# Patient Record
Sex: FEMALE | Race: WHITE | NOT HISPANIC OR LATINO | Employment: UNEMPLOYED | URBAN - METROPOLITAN AREA
[De-identification: names, ages, dates, MRNs, and addresses within clinical notes are randomized per-mention and may not be internally consistent; named-entity substitution may affect disease eponyms.]

---
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
## 2019-03-08 ENCOUNTER — TELEPHONE (OUTPATIENT)
Dept: FAMILY MEDICINE | Facility: CLINIC | Age: 19
End: 2019-03-08

## 2019-03-08 NOTE — TELEPHONE ENCOUNTER
Advised patient to call by 8AM tomorrow morning for Saturday AM apt at [place], she verbalized understanding

## 2019-04-11 ENCOUNTER — HOSPITAL ENCOUNTER (OUTPATIENT)
Dept: RADIOLOGY | Facility: HOSPITAL | Age: 19
Discharge: HOME | End: 2019-04-11
Attending: INTERNAL MEDICINE
Payer: COMMERCIAL

## 2019-04-11 DIAGNOSIS — Z02.0 SCHOOL PHYSICAL EXAM: ICD-10-CM

## 2019-04-11 DIAGNOSIS — Z02.0 SCHOOL PHYSICAL EXAM: Primary | ICD-10-CM

## 2019-04-11 PROCEDURE — 71046 X-RAY EXAM CHEST 2 VIEWS: CPT | Mod: TC,FY

## 2019-04-11 PROCEDURE — 71046 XR CHEST PA AND LATERAL: ICD-10-PCS | Mod: 26,,, | Performed by: RADIOLOGY

## 2019-04-11 PROCEDURE — 71046 X-RAY EXAM CHEST 2 VIEWS: CPT | Mod: 26,,, | Performed by: RADIOLOGY

## 2021-01-02 ENCOUNTER — IMMUNIZATION (OUTPATIENT)
Dept: INTERNAL MEDICINE | Facility: CLINIC | Age: 21
End: 2021-01-02
Payer: COMMERCIAL

## 2021-01-02 DIAGNOSIS — Z23 NEED FOR VACCINATION: ICD-10-CM

## 2021-01-02 PROCEDURE — 91300 COVID-19, MRNA, LNP-S, PF, 30 MCG/0.3 ML DOSE VACCINE: CPT | Mod: PBBFAC | Performed by: INTERNAL MEDICINE

## 2021-01-23 ENCOUNTER — IMMUNIZATION (OUTPATIENT)
Dept: INTERNAL MEDICINE | Facility: CLINIC | Age: 21
End: 2021-01-23
Payer: COMMERCIAL

## 2021-01-23 DIAGNOSIS — Z23 NEED FOR VACCINATION: Primary | ICD-10-CM

## 2021-01-23 PROCEDURE — 0002A COVID-19, MRNA, LNP-S, PF, 30 MCG/0.3 ML DOSE VACCINE: CPT | Mod: PBBFAC | Performed by: INTERNAL MEDICINE

## 2021-01-23 PROCEDURE — 91300 COVID-19, MRNA, LNP-S, PF, 30 MCG/0.3 ML DOSE VACCINE: CPT | Mod: PBBFAC | Performed by: INTERNAL MEDICINE

## 2022-04-04 ENCOUNTER — TELEPHONE (OUTPATIENT)
Dept: INTERNAL MEDICINE | Facility: CLINIC | Age: 22
End: 2022-04-04
Payer: COMMERCIAL

## 2022-04-04 NOTE — TELEPHONE ENCOUNTER
----- Message from Deborah Venegas sent at 4/4/2022  8:46 AM CDT -----  Type:  Needs Medical Advice    Who Called: pt  Symptoms (please be specific): pt would like to schedule appt to est care     Would the patient rather a call back or a response via [facility]? call  Best Call Back Number: [phone]  Additional Information:

## 2022-08-25 LAB
HPV RNA, HR E6/E7, TMA: NOT DETECTED
PAP RECOMMENDATION EXT: NORMAL

## 2023-03-08 ENCOUNTER — OFFICE VISIT (OUTPATIENT)
Dept: OBSTETRICS AND GYNECOLOGY | Facility: CLINIC | Age: 23
End: 2023-03-08
Payer: COMMERCIAL

## 2023-03-08 DIAGNOSIS — N76.1 CHRONIC VAGINITIS: ICD-10-CM

## 2023-03-08 DIAGNOSIS — N76.5 VAGINAL ULCERATION: ICD-10-CM

## 2023-03-08 DIAGNOSIS — N89.8 VAGINAL IRRITATION: Primary | ICD-10-CM

## 2023-03-08 DIAGNOSIS — N94.2 VAGINISMUS: ICD-10-CM

## 2023-03-08 PROCEDURE — 81514 NFCT DS BV&VAGINITIS DNA ALG: CPT | Performed by: OBSTETRICS & GYNECOLOGY

## 2023-03-08 PROCEDURE — 99204 PR OFFICE/OUTPT VISIT, NEW, LEVL IV, 45-59 MIN: ICD-10-PCS | Mod: S$GLB,,, | Performed by: OBSTETRICS & GYNECOLOGY

## 2023-03-08 PROCEDURE — 99204 OFFICE O/P NEW MOD 45 MIN: CPT | Mod: S$GLB,,, | Performed by: OBSTETRICS & GYNECOLOGY

## 2023-03-08 PROCEDURE — 99999 PR PBB SHADOW E&M-EST. PATIENT-LVL III: ICD-10-PCS | Mod: PBBFAC,,, | Performed by: OBSTETRICS & GYNECOLOGY

## 2023-03-08 PROCEDURE — 99999 PR PBB SHADOW E&M-EST. PATIENT-LVL III: CPT | Mod: PBBFAC,,, | Performed by: OBSTETRICS & GYNECOLOGY

## 2023-03-08 RX ORDER — NORGESTIMATE AND ETHINYL ESTRADIOL 0.25-0.035
1 KIT ORAL DAILY
Qty: 84 TABLET | Refills: 3 | Status: SHIPPED | OUTPATIENT
Start: 2023-03-08 | End: 2023-06-02

## 2023-03-08 NOTE — PROGRESS NOTES
Chief Complaint   Patient presents with    vaginal irritation       HPI:   Talita Lazaro 23 y.o.  is here for several issues. She didn't have issues before becoming sexually active. She started birth control 1-2  months before she was  last year and became sexually active then had difficulty with vaginal penetration for about 2 months before she was able to have intercourse. She feels like the difficulty likely had an emotional/psychologic component and over time she was able to relax enough to have intercourse but she reports when she does have intercourse now it is painful. Afterwards she will feel swollen and irritated for several days. There is an area at the introitus that was irritated then a few months ago during intercourse seemed to make a deep cut that has never healed completely and that will hurt during intercourse and burn when urine touches it. She also will use lubricants (has tried several brands) and coconut oil during intercourse still after a few minutes will have vaginal dryness during intercourse. She has had 6 separate yeast infections in the past 3 months. Only one was swabbed positive and that was around September of last year but other swabs were negative.  She was seeing on ob-gyn who did a heavy dose diflucan and then diflucan weekly for 2 months, she feels like the irritation was less then. She has switched soap/detergents and changed to cotton underwear. Her partner is circumised and he doesn't have issues besides noticing that it does feel dry during intercourse. They tried changing her birth control pills from sprintec to a different brand but she didn't notice a difference. This is her only partner and declines STD testing.    She does feel that she has some dry eyes/mouth. She does notice vaginal discharge but reports she has always had a vaginal discharge even before this started.      Patient's last menstrual period was 02/24/2023.     History reviewed. No pertinent past  medical history.    Past Surgical History:   Procedure Laterality Date    RHINOPLASTY      WISDOM TOOTH EXTRACTION         History reviewed. No pertinent family history.    Social History     Socioeconomic History    Marital status: Single   Tobacco Use    Smoking status: Never    Smokeless tobacco: Never   Substance and Sexual Activity    Alcohol use: Never    Drug use: Never    Sexual activity: Yes     Partners: Male     Birth control/protection: OCP   Social History Narrative    Lives at home with both parents and siblings        No smokers        No pets        Attends [employer]    Grade 9       OB History          0    Para   0    Term   0       0    AB   0    Living   0         SAB   0    IAB   0    Ectopic   0    Multiple   0    Live Births   0                  COMPREHENSIVE GYN HISTORY:  PAP History: Denies abnormal Paps.  Infection History: Denies STDs. Denies PID.  Benign History: Denies uterine fibroids. Denies ovarian cysts. Denies endometriosis.   Cancer History: Denies cervical cancer. Denies uterine cancer or hyperplasia. Denies ovarian cancer. Denies vulvar cancer or pre-cancer. Denies vaginal cancer or pre-cancer. Denies breast cancer. Denies colon cancer.  Sexual Activity History:   reports being sexually active and has had partner(s) who are male. She reports using the following method of birth control/protection: OCP.         ROS:    All other ROS negative     PE:   LMP 2023     APPEARANCE: Well nourished, well developed, in no acute distress.  NEUROLOGIC: orientated to person, place and time, normal mood and affect     PELVIC:   EXTERNAL GENITALIA/VULVA: in a shallow 5mm ulcer/thin skin at the introitus with thicker skin around it. No significant erythema, drainage at this area, is tender to palpation.  Normal female genitalia. Pelvic floor tight no spasms noted.   URETHRAL MEATUS: Normal size and location, no lesions, no prolapse.  URETHRA: No masses, tenderness,  prolapse or scarring.  BLADDER: non-tender, no masses  VAGINA: well rugated however erythematous in appearance, thick clumpy and thin watery yellow discharge, no significant cystocele or rectocele.  CERVIX: No lesions and discharge.  UTERUS: normal size, regular shape, mobile, non-tender, bladder base nontender.  ADNEXA: No masses or tenderness.  PERINEUM: normal in appearance, no external hemorrhoids         1. Vaginal irritation    2. Vaginal ulceration    3. Chronic vaginitis    4. Vaginismus        Plan:  Most likely a component of tight pelvic musculature/spasm that is contributing however pelvic muscles not noted to spasm on exam. Tolerated a small speculum well. Discharge appears yeast like, affrim done, declines gc/ct. Will treat for yeast since seemed to improve with treatment for that last time and await swab results. Can't pinpoint an irritant. Non-healing ulcerated area could be related to friction/intercourse but suspect underlying condition causing the irritation/discharge is contributing to it not healing. Discussed with her that some of the things that can cause her symptoms are going to be a process of elimination. Could be slightly hypoestrogenic due to birth control as well though this would be a more of an extreme presentation than I would expect being that she isn't on a progesterone only birth control.         Face to Face time with patient: 45 minutes of total time spent on the encounter, which includes face to face time and non-face to face time preparing to see the patient (eg, review of tests), Obtaining and/or reviewing separately obtained history, Documenting clinical information in the electronic or other health record, Independently interpreting results (not separately reported) and communicating results to the patient/family/caregiver, or Care coordination (not separately reported).

## 2023-03-10 LAB
BACTERIAL VAGINOSIS DNA: NEGATIVE
CANDIDA GLABRATA DNA: NEGATIVE
CANDIDA KRUSEI DNA: NEGATIVE
CANDIDA RRNA VAG QL PROBE: NEGATIVE
T VAGINALIS RRNA GENITAL QL PROBE: NEGATIVE

## 2023-03-11 ENCOUNTER — PATIENT MESSAGE (OUTPATIENT)
Dept: OBSTETRICS AND GYNECOLOGY | Facility: HOSPITAL | Age: 23
End: 2023-03-11
Payer: COMMERCIAL

## 2023-03-11 ENCOUNTER — PATIENT MESSAGE (OUTPATIENT)
Dept: OBSTETRICS AND GYNECOLOGY | Facility: CLINIC | Age: 23
End: 2023-03-11
Payer: COMMERCIAL

## 2023-03-13 ENCOUNTER — TELEPHONE (OUTPATIENT)
Dept: OBSTETRICS AND GYNECOLOGY | Facility: CLINIC | Age: 23
End: 2023-03-13
Payer: COMMERCIAL

## 2023-03-13 RX ORDER — FLUCONAZOLE 200 MG/1
200 TABLET ORAL
Qty: 3 TABLET | Refills: 0 | Status: SHIPPED | OUTPATIENT
Start: 2023-03-13 | End: 2023-03-20

## 2023-03-13 NOTE — TELEPHONE ENCOUNTER
Appt made for April 4th, pt wants to make sure that you are ok with that date.     Pt also went to the pharmacy. They hadn't received her script yet

## 2023-03-13 NOTE — TELEPHONE ENCOUNTER
----- Message from Marsha Jeffery sent at 3/13/2023 10:22 AM CDT -----  .Type:  Needs Medical Advice    Who Called: pt   Would the patient rather a call back or a response via [facility]? call  Best Call Back Number: [phone]  Additional Information:       Pt wanted to speak to Amita about antibiotics that were supposed to be sent to the pharmacy after receiving her test results.      Pt would also like to move current appt up to an earlier date if possible. Says that she would like to know if Dr. Walton wants to see her 4/4 or sooner than that

## 2023-03-15 ENCOUNTER — PATIENT OUTREACH (OUTPATIENT)
Dept: ADMINISTRATIVE | Facility: HOSPITAL | Age: 23
End: 2023-03-15
Payer: COMMERCIAL

## 2023-03-21 ENCOUNTER — OFFICE VISIT (OUTPATIENT)
Dept: OBSTETRICS AND GYNECOLOGY | Facility: CLINIC | Age: 23
End: 2023-03-21
Payer: COMMERCIAL

## 2023-03-21 VITALS
DIASTOLIC BLOOD PRESSURE: 62 MMHG | BODY MASS INDEX: 21.53 KG/M2 | HEIGHT: 63 IN | WEIGHT: 121.5 LBS | SYSTOLIC BLOOD PRESSURE: 99 MMHG

## 2023-03-21 DIAGNOSIS — N76.1 CHRONIC VAGINITIS: ICD-10-CM

## 2023-03-21 DIAGNOSIS — N89.8 VAGINAL IRRITATION: Primary | ICD-10-CM

## 2023-03-21 DIAGNOSIS — N76.5 VAGINAL ULCERATION: ICD-10-CM

## 2023-03-21 PROCEDURE — 99215 OFFICE O/P EST HI 40 MIN: CPT | Mod: S$GLB,,, | Performed by: OBSTETRICS & GYNECOLOGY

## 2023-03-21 PROCEDURE — 99999 PR PBB SHADOW E&M-EST. PATIENT-LVL III: ICD-10-PCS | Mod: PBBFAC,,, | Performed by: OBSTETRICS & GYNECOLOGY

## 2023-03-21 PROCEDURE — 3008F BODY MASS INDEX DOCD: CPT | Mod: CPTII,S$GLB,, | Performed by: OBSTETRICS & GYNECOLOGY

## 2023-03-21 PROCEDURE — 3074F SYST BP LT 130 MM HG: CPT | Mod: CPTII,S$GLB,, | Performed by: OBSTETRICS & GYNECOLOGY

## 2023-03-21 PROCEDURE — 1159F PR MEDICATION LIST DOCUMENTED IN MEDICAL RECORD: ICD-10-PCS | Mod: CPTII,S$GLB,, | Performed by: OBSTETRICS & GYNECOLOGY

## 2023-03-21 PROCEDURE — 99215 PR OFFICE/OUTPT VISIT, EST, LEVL V, 40-54 MIN: ICD-10-PCS | Mod: S$GLB,,, | Performed by: OBSTETRICS & GYNECOLOGY

## 2023-03-21 PROCEDURE — 3074F PR MOST RECENT SYSTOLIC BLOOD PRESSURE < 130 MM HG: ICD-10-PCS | Mod: CPTII,S$GLB,, | Performed by: OBSTETRICS & GYNECOLOGY

## 2023-03-21 PROCEDURE — 3008F PR BODY MASS INDEX (BMI) DOCUMENTED: ICD-10-PCS | Mod: CPTII,S$GLB,, | Performed by: OBSTETRICS & GYNECOLOGY

## 2023-03-21 PROCEDURE — 99999 PR PBB SHADOW E&M-EST. PATIENT-LVL III: CPT | Mod: PBBFAC,,, | Performed by: OBSTETRICS & GYNECOLOGY

## 2023-03-21 PROCEDURE — 3078F DIAST BP <80 MM HG: CPT | Mod: CPTII,S$GLB,, | Performed by: OBSTETRICS & GYNECOLOGY

## 2023-03-21 PROCEDURE — 1159F MED LIST DOCD IN RCRD: CPT | Mod: CPTII,S$GLB,, | Performed by: OBSTETRICS & GYNECOLOGY

## 2023-03-21 PROCEDURE — 3078F PR MOST RECENT DIASTOLIC BLOOD PRESSURE < 80 MM HG: ICD-10-PCS | Mod: CPTII,S$GLB,, | Performed by: OBSTETRICS & GYNECOLOGY

## 2023-03-21 RX ORDER — DROSPIRENONE AND ESTETROL 3-14.2(28)
1 KIT ORAL
COMMUNITY
Start: 2023-01-11 | End: 2023-06-02

## 2023-03-22 RX ORDER — CLINDAMYCIN PHOSPHATE 20 MG/G
CREAM VAGINAL NIGHTLY
Qty: 40 G | Refills: 1 | Status: SHIPPED | OUTPATIENT
Start: 2023-03-22 | End: 2023-04-05

## 2023-03-22 NOTE — PROGRESS NOTES
Chief Complaint   Patient presents with    Vaginal Pain       HPI:   Talita Lazaro 23 y.o.  is here for f/u after diflucan. She didn't have issues before becoming sexually active. She started birth control 1-2  months before she was  last year and became sexually active then had difficulty with vaginal penetration for about 2 months before she was able to have intercourse. She feels like the difficulty likely had an emotional/psychologic component and over time she was able to relax enough to have intercourse but she reports when she does have intercourse now it is painful. Afterwards she will feel swollen and irritated for several days. There is an area at the introitus that was irritated then a few months ago during intercourse seemed to make a deep cut that has never healed completely and that will hurt during intercourse and burn when urine touches it. She also will use lubricants (has tried several brands) and coconut oil during intercourse still after a few minutes will have vaginal dryness during intercourse. She has had 6 separate yeast infections in the past 3 months. Only one was swabbed positive and that was around September of last year but other swabs were negative.  She was seeing on ob-gyn who did a heavy dose diflucan and then diflucan weekly for 2 months, she feels like the irritation was less then. She has switched soap/detergents and changed to cotton underwear. Her partner is circumised and he doesn't have issues besides noticing that it does feel dry during intercourse. They tried changing her birth control pills from sprintec to a different brand but she didn't notice a difference. This is her only partner and declines STD testing.    She does feel that she has some dry eyes/mouth. She does notice vaginal discharge but reports she has always had a vaginal discharge even before this started.     Is accompanied by partner today, Since last visit reports that had tried vaginal moisturizer  in the past and felt like it caused inflammation around her urethra. She also reports that her partner was away for a month and the ulcerated area didn't heal then. She doesn't seem to have skin reactions to his semen if it touches other places. She also seems to be getting irritation and inflammation when on her cycle. They are not sexually active when she is on her cycle. It will cause inflammation on the labia majora and she will place A&D on it and the skin will peel.      Patient's last menstrual period was 2023.     No past medical history on file.    Past Surgical History:   Procedure Laterality Date    RHINOPLASTY      WISDOM TOOTH EXTRACTION         No family history on file.    Social History     Socioeconomic History    Marital status: Single   Tobacco Use    Smoking status: Never    Smokeless tobacco: Never   Substance and Sexual Activity    Alcohol use: Never    Drug use: Never    Sexual activity: Yes     Partners: Male     Birth control/protection: OCP   Social History Narrative    Lives at home with both parents and siblings        No smokers        No pets        Attends [employer]    Grade 9       OB History          0    Para   0    Term   0       0    AB   0    Living   0         SAB   0    IAB   0    Ectopic   0    Multiple   0    Live Births   0                  COMPREHENSIVE GYN HISTORY:  PAP History: Denies abnormal Paps.  Infection History: Denies STDs. Denies PID.  Benign History: Denies uterine fibroids. Denies ovarian cysts. Denies endometriosis.   Cancer History: Denies cervical cancer. Denies uterine cancer or hyperplasia. Denies ovarian cancer. Denies vulvar cancer or pre-cancer. Denies vaginal cancer or pre-cancer. Denies breast cancer. Denies colon cancer.  Sexual Activity History:   reports being sexually active and has had partner(s) who are male. She reports using the following method of birth control/protection: OCP.         ROS:    All other ROS negative  "    PE:   BP 99/62   Ht 5' 3" (1.6 m)   Wt 55.1 kg (121 lb 7.6 oz)   LMP 02/24/2023   BMI 21.52 kg/m²     APPEARANCE: Well nourished, well developed, in no acute distress.  NEUROLOGIC: orientated to person, place and time, normal mood and affect     PELVIC:   EXTERNAL GENITALIA/VULVA: in a shallow 5mm ulcer/thin skin at the introitus with thicker skin around it with slight white appearance. No significant erythema, drainage at this area, is tender to palpation.  Normal female genitalia. Pelvic floor tight no spasms noted.   URETHRAL MEATUS: Normal size and location, no lesions, no prolapse.  URETHRA: No masses, tenderness, prolapse or scarring.  BLADDER: non-tender, no masses  VAGINA: well rugated however erythematous in appearance, thick clumpy and thin watery yellow discharge, no change from last visit, no significant cystocele or rectocele.  CERVIX: No lesions and discharge.  UTERUS: normal size, regular shape, mobile, non-tender, bladder base nontender.  ADNEXA: No masses or tenderness.  PERINEUM: normal in appearance, no external hemorrhoids         1. Vaginal irritation    2. Vaginal ulceration    3. Chronic vaginitis          Plan:  Yeast swab was negative and no improvement with treatment. Discussed with her and her partner that I suspect what we thought was yeast in the past likely wasn't and there is some other underlying etiology causing the inflammation. Could be allergic, though can't pinpoint an irritant, could be DIV, could be atropic related. Doesn't make since that is having a flair when sexually active if it is intercourse related. Non-healing ulcerated area could be related to friction/intercourse but suspect underlying condition causing the irritation/discharge is contributing to it not healing. Discussed with her that some of the things that can cause her symptoms are going to be a process of elimination. Could be slightly hypoestrogenic due to birth control as well though this would be a " more of an extreme presentation than I would expect being that she isn't on a progesterone only birth control. We discussed choices for treatment and to try to eliminate things one at a time. Choices would be to stop the birth control and/or add back vaginal estrogen, if the ulcerated area doesn't heal within the next month then needs biopsy of it. Other choice would be to treat for DIV. If resolves with DIV treatment then birth control could be contributing and may need to do vaginal estrogen.        Face to Face time with patient: 90 minutes of total time spent on the encounter, which includes face to face time and non-face to face time preparing to see the patient (eg, review of tests), Obtaining and/or reviewing separately obtained history, Documenting clinical information in the electronic or other health record, Independently interpreting results (not separately reported) and communicating results to the patient/family/caregiver, or Care coordination (not separately reported).

## 2023-03-23 ENCOUNTER — PATIENT MESSAGE (OUTPATIENT)
Dept: OBSTETRICS AND GYNECOLOGY | Facility: CLINIC | Age: 23
End: 2023-03-23
Payer: COMMERCIAL

## 2023-05-09 ENCOUNTER — PATIENT MESSAGE (OUTPATIENT)
Dept: OBSTETRICS AND GYNECOLOGY | Facility: CLINIC | Age: 23
End: 2023-05-09
Payer: COMMERCIAL

## 2023-05-12 ENCOUNTER — PATIENT MESSAGE (OUTPATIENT)
Dept: OBSTETRICS AND GYNECOLOGY | Facility: CLINIC | Age: 23
End: 2023-05-12

## 2023-05-12 ENCOUNTER — OFFICE VISIT (OUTPATIENT)
Dept: OBSTETRICS AND GYNECOLOGY | Facility: CLINIC | Age: 23
End: 2023-05-12
Payer: COMMERCIAL

## 2023-05-12 VITALS — BODY MASS INDEX: 21.68 KG/M2 | WEIGHT: 122.38 LBS

## 2023-05-12 DIAGNOSIS — Z30.09 ENCOUNTER FOR OTHER GENERAL COUNSELING OR ADVICE ON CONTRACEPTION: ICD-10-CM

## 2023-05-12 DIAGNOSIS — N76.1 CHRONIC VAGINITIS: ICD-10-CM

## 2023-05-12 DIAGNOSIS — R30.0 DYSURIA: Primary | ICD-10-CM

## 2023-05-12 PROCEDURE — 1159F MED LIST DOCD IN RCRD: CPT | Mod: CPTII,S$GLB,, | Performed by: OBSTETRICS & GYNECOLOGY

## 2023-05-12 PROCEDURE — 99999 PR PBB SHADOW E&M-EST. PATIENT-LVL II: CPT | Mod: PBBFAC,,, | Performed by: OBSTETRICS & GYNECOLOGY

## 2023-05-12 PROCEDURE — 99215 OFFICE O/P EST HI 40 MIN: CPT | Mod: S$GLB,,, | Performed by: OBSTETRICS & GYNECOLOGY

## 2023-05-12 PROCEDURE — 3008F PR BODY MASS INDEX (BMI) DOCUMENTED: ICD-10-PCS | Mod: CPTII,S$GLB,, | Performed by: OBSTETRICS & GYNECOLOGY

## 2023-05-12 PROCEDURE — 1159F PR MEDICATION LIST DOCUMENTED IN MEDICAL RECORD: ICD-10-PCS | Mod: CPTII,S$GLB,, | Performed by: OBSTETRICS & GYNECOLOGY

## 2023-05-12 PROCEDURE — 99999 PR PBB SHADOW E&M-EST. PATIENT-LVL II: ICD-10-PCS | Mod: PBBFAC,,, | Performed by: OBSTETRICS & GYNECOLOGY

## 2023-05-12 PROCEDURE — 99215 PR OFFICE/OUTPT VISIT, EST, LEVL V, 40-54 MIN: ICD-10-PCS | Mod: S$GLB,,, | Performed by: OBSTETRICS & GYNECOLOGY

## 2023-05-12 PROCEDURE — 3008F BODY MASS INDEX DOCD: CPT | Mod: CPTII,S$GLB,, | Performed by: OBSTETRICS & GYNECOLOGY

## 2023-05-12 NOTE — PROGRESS NOTES
Chief Complaint   Patient presents with    Follow-up       HPI:   Talita Lazaro 23 y.o.  is here for f/u after clindamycin cream and stopping ocps. She has been off for a month. She didn't have issues before becoming sexually active. She is now able to make more lubrication with intercourse and it is no longer painful except where that cut is. She doesn't have the swelling afterwards. She hasn't had any more feelings of yeast. She has had a UTI, typically feelings of dysuria and frequency and went to urgent care and was told she had leuk est in her urine so they treated for a UTI and it resolved. She reports the heavy discharge seems to have resolved. She doesn't notice it on her underwear anymore. The cut area is still there. She doesn't want to get pregnant but doesn't know what she wants to do for birth control. Condoms are also somewhat uncomfortable       Patient's last menstrual period was 2023 (exact date).     History reviewed. No pertinent past medical history.    Past Surgical History:   Procedure Laterality Date    RHINOPLASTY      WISDOM TOOTH EXTRACTION         History reviewed. No pertinent family history.    Social History     Socioeconomic History    Marital status: Single   Tobacco Use    Smoking status: Never    Smokeless tobacco: Never   Substance and Sexual Activity    Alcohol use: Never    Drug use: Never    Sexual activity: Yes     Partners: Male     Birth control/protection: OCP   Social History Narrative    Lives at home with both parents and siblings        No smokers        No pets        Attends [employer]    Grade 9       OB History          0    Para   0    Term   0       0    AB   0    Living   0         SAB   0    IAB   0    Ectopic   0    Multiple   0    Live Births   0                  COMPREHENSIVE GYN HISTORY:  PAP History: Denies abnormal Paps.  Infection History: Denies STDs. Denies PID.  Benign History: Denies uterine fibroids. Denies ovarian cysts.  Denies endometriosis.   Cancer History: Denies cervical cancer. Denies uterine cancer or hyperplasia. Denies ovarian cancer. Denies vulvar cancer or pre-cancer. Denies vaginal cancer or pre-cancer. Denies breast cancer. Denies colon cancer.  Sexual Activity History:   reports being sexually active and has had partner(s) who are male. She reports using the following method of birth control/protection: OCP.         ROS:    All other ROS negative     PE:   Wt 55.5 kg (122 lb 6.4 oz)   LMP 04/21/2023 (Exact Date)   BMI 21.68 kg/m²     APPEARANCE: Well nourished, well developed, in no acute distress.  NEUROLOGIC: orientated to person, place and time, normal mood and affect     PELVIC:   EXTERNAL GENITALIA/VULVA: in a shallow 5mm ulcer/thin skin at the introitus with thicker skin around it with slight white appearance. No significant erythema, drainage at this area, is tender to palpation.  Normal female genitalia. Pelvic floor tight no spasms noted.   URETHRAL MEATUS: Normal size and location, no lesions, no prolapse.  URETHRA: No masses, tenderness, prolapse or scarring.  BLADDER: non-tender, no masses  VAGINA: normal in appearance, no discharge, no change from last visit, no significant cystocele or rectocele.  CERVIX: No lesions and discharge.  UTERUS: normal size, regular shape, mobile, non-tender, bladder base nontender.  ADNEXA: No masses or tenderness.  PERINEUM: normal in appearance, no external hemorrhoids         1. Dysuria    2. Chronic vaginitis    3. Encounter for other general counseling or advice on contraception          Plan:  Suspect most of the issue was DIV possibly caused exacerbated by hypo-estrogen on ocps. Area on perineum is improved  but to resolved. Discussed that I would recommend biopsy at this time. Reasonable to try to add an estrogen to the area for a few weeks but if not better then to biopsy, could be chronic irritant causing it as well but if was a tear then I would have expected it  to resolve. I would recommend to excise and repair the area as a last resort because I would be concerned about stenosis to the area.   Discussed birth control choices, feel like paragard IUD would be best however I am concerned about her h/o recurrent vaginitis. Reasonable to try a different pill vs patch.   Hard to saw about recurrent UTIs. Would recommend leaving a urine sample with us for culture next time she has one. If related to intercourse then can do a preventative after intercourse         Face to Face time with patient: 50 minutes of total time spent on the encounter, which includes face to face time and non-face to face time preparing to see the patient (eg, review of tests), Obtaining and/or reviewing separately obtained history, Documenting clinical information in the electronic or other health record, Independently interpreting results (not separately reported) and communicating results to the patient/family/caregiver, or Care coordination (not separately reported).

## 2023-05-20 ENCOUNTER — PATIENT MESSAGE (OUTPATIENT)
Dept: OBSTETRICS AND GYNECOLOGY | Facility: CLINIC | Age: 23
End: 2023-05-20
Payer: COMMERCIAL

## 2023-05-21 RX ORDER — ESTRADIOL 0.1 MG/G
CREAM VAGINAL
Qty: 42.5 G | Refills: 1 | Status: SHIPPED | OUTPATIENT
Start: 2023-05-21 | End: 2023-05-24 | Stop reason: SDUPTHER

## 2023-05-24 RX ORDER — ESTRADIOL 0.1 MG/G
CREAM VAGINAL
Qty: 42.5 G | Refills: 1 | Status: SHIPPED | OUTPATIENT
Start: 2023-05-24

## 2023-05-24 NOTE — TELEPHONE ENCOUNTER
----- Message from Bc Smith sent at 5/24/2023  1:36 PM CDT -----  Type:  RX Refill Request    Who Called: pt  Refill or New Rx:refill  RX Name and Strength:estradioL (ESTRACE) 0.01 % (0.1 mg/gram) vaginal cream  Preferred Pharmacy with phone number:[facility] pharmacy [id number]  Local or Mail Order:local  Ordering Provider:dayna  Would the patient rather a call back or a response via [facility]? Call   Best Call Back Number:[phone]  Additional Information: medication was sent to the wrong pharmacy

## 2023-05-31 ENCOUNTER — PATIENT MESSAGE (OUTPATIENT)
Dept: OBSTETRICS AND GYNECOLOGY | Facility: CLINIC | Age: 23
End: 2023-05-31
Payer: COMMERCIAL

## 2023-06-02 ENCOUNTER — TELEPHONE (OUTPATIENT)
Dept: FAMILY MEDICINE | Facility: CLINIC | Age: 23
End: 2023-06-02
Payer: COMMERCIAL

## 2023-06-02 ENCOUNTER — PATIENT MESSAGE (OUTPATIENT)
Dept: FAMILY MEDICINE | Facility: CLINIC | Age: 23
End: 2023-06-02

## 2023-06-02 ENCOUNTER — OFFICE VISIT (OUTPATIENT)
Dept: FAMILY MEDICINE | Facility: CLINIC | Age: 23
End: 2023-06-02
Payer: COMMERCIAL

## 2023-06-02 VITALS
OXYGEN SATURATION: 99 % | SYSTOLIC BLOOD PRESSURE: 110 MMHG | BODY MASS INDEX: 22.34 KG/M2 | DIASTOLIC BLOOD PRESSURE: 58 MMHG | WEIGHT: 126.13 LBS | HEART RATE: 73 BPM

## 2023-06-02 DIAGNOSIS — Z11.4 ENCOUNTER FOR SCREENING FOR HIV: ICD-10-CM

## 2023-06-02 DIAGNOSIS — M25.50 MULTIPLE JOINT PAIN: ICD-10-CM

## 2023-06-02 DIAGNOSIS — Z11.59 NEED FOR HEPATITIS C SCREENING TEST: ICD-10-CM

## 2023-06-02 DIAGNOSIS — Z00.01 ENCOUNTER FOR GENERAL ADULT MEDICAL EXAMINATION WITH ABNORMAL FINDINGS: Primary | ICD-10-CM

## 2023-06-02 DIAGNOSIS — E55.9 VITAMIN D DEFICIENCY: ICD-10-CM

## 2023-06-02 DIAGNOSIS — N89.8 VAGINAL DRYNESS: ICD-10-CM

## 2023-06-02 DIAGNOSIS — R68.2 DRY MOUTH: ICD-10-CM

## 2023-06-02 DIAGNOSIS — R53.83 FATIGUE, UNSPECIFIED TYPE: ICD-10-CM

## 2023-06-02 PROBLEM — H60.12 CELLULITIS OF LEFT EARLOBE: Status: RESOLVED | Noted: 2017-05-29 | Resolved: 2023-06-02

## 2023-06-02 PROCEDURE — 1159F MED LIST DOCD IN RCRD: CPT | Mod: CPTII,S$GLB,, | Performed by: FAMILY MEDICINE

## 2023-06-02 PROCEDURE — 3078F PR MOST RECENT DIASTOLIC BLOOD PRESSURE < 80 MM HG: ICD-10-PCS | Mod: CPTII,S$GLB,, | Performed by: FAMILY MEDICINE

## 2023-06-02 PROCEDURE — 1159F PR MEDICATION LIST DOCUMENTED IN MEDICAL RECORD: ICD-10-PCS | Mod: CPTII,S$GLB,, | Performed by: FAMILY MEDICINE

## 2023-06-02 PROCEDURE — 3008F BODY MASS INDEX DOCD: CPT | Mod: CPTII,S$GLB,, | Performed by: FAMILY MEDICINE

## 2023-06-02 PROCEDURE — 3044F HG A1C LEVEL LT 7.0%: CPT | Mod: CPTII,S$GLB,, | Performed by: FAMILY MEDICINE

## 2023-06-02 PROCEDURE — 99999 PR PBB SHADOW E&M-EST. PATIENT-LVL III: ICD-10-PCS | Mod: PBBFAC,,, | Performed by: FAMILY MEDICINE

## 2023-06-02 PROCEDURE — 99213 OFFICE O/P EST LOW 20 MIN: CPT | Mod: 25,S$GLB,, | Performed by: FAMILY MEDICINE

## 2023-06-02 PROCEDURE — 3074F SYST BP LT 130 MM HG: CPT | Mod: CPTII,S$GLB,, | Performed by: FAMILY MEDICINE

## 2023-06-02 PROCEDURE — 3074F PR MOST RECENT SYSTOLIC BLOOD PRESSURE < 130 MM HG: ICD-10-PCS | Mod: CPTII,S$GLB,, | Performed by: FAMILY MEDICINE

## 2023-06-02 PROCEDURE — 99395 PREV VISIT EST AGE 18-39: CPT | Mod: S$GLB,,, | Performed by: FAMILY MEDICINE

## 2023-06-02 PROCEDURE — 99395 PR PREVENTIVE VISIT,EST,18-39: ICD-10-PCS | Mod: S$GLB,,, | Performed by: FAMILY MEDICINE

## 2023-06-02 PROCEDURE — 3008F PR BODY MASS INDEX (BMI) DOCUMENTED: ICD-10-PCS | Mod: CPTII,S$GLB,, | Performed by: FAMILY MEDICINE

## 2023-06-02 PROCEDURE — 99999 PR PBB SHADOW E&M-EST. PATIENT-LVL III: CPT | Mod: PBBFAC,,, | Performed by: FAMILY MEDICINE

## 2023-06-02 PROCEDURE — 1160F PR REVIEW ALL MEDS BY PRESCRIBER/CLIN PHARMACIST DOCUMENTED: ICD-10-PCS | Mod: CPTII,S$GLB,, | Performed by: FAMILY MEDICINE

## 2023-06-02 PROCEDURE — 99213 PR OFFICE/OUTPT VISIT, EST, LEVL III, 20-29 MIN: ICD-10-PCS | Mod: 25,S$GLB,, | Performed by: FAMILY MEDICINE

## 2023-06-02 PROCEDURE — 3044F PR MOST RECENT HEMOGLOBIN A1C LEVEL <7.0%: ICD-10-PCS | Mod: CPTII,S$GLB,, | Performed by: FAMILY MEDICINE

## 2023-06-02 PROCEDURE — 1160F RVW MEDS BY RX/DR IN RCRD: CPT | Mod: CPTII,S$GLB,, | Performed by: FAMILY MEDICINE

## 2023-06-02 PROCEDURE — 3078F DIAST BP <80 MM HG: CPT | Mod: CPTII,S$GLB,, | Performed by: FAMILY MEDICINE

## 2023-06-02 RX ORDER — NITROFURANTOIN 25; 75 MG/1; MG/1
100 CAPSULE ORAL 2 TIMES DAILY
COMMUNITY
Start: 2023-05-04 | End: 2023-06-02

## 2023-06-02 RX ORDER — CLINDAMYCIN PHOSPHATE 20 MG/G
CREAM VAGINAL
COMMUNITY
Start: 2023-04-08 | End: 2023-06-02

## 2023-06-02 NOTE — PROGRESS NOTES
Subjective:       Patient ID: Talita Lazaro is a 23 y.o. female.    Chief Complaint: Annual Exam    Patient Active Problem List   Diagnosis    Perineal fissure      HPI  22 yo female presents today to establish primary care.   She would like testing for Sjogren's. Does have dry mouth and patient suspects possible relation with recent vaginal dryness with sex.   Had opthalmology exam without clear diagnostic findings.    History of perineal fissure, recurrent vs post healing without re-approximation. Pain and swelling in introitus posterior apex with intercourse. With some improvement since initial presentation.   Patient has seen dermatology and Gynecology ongoing for treatment and testing. Gyn suspects DIV and patient finds improvement on vaginal estrogen and artificial lubrication as needed.   Dermatology believes that mucosal lining of area of concern is healed, but given question about continued non healing suggested biopsy.  Started after consummating marriage. Patient has some concern of potential vaginal dryness exacerbated by OCPs, currently not taking, but interested in contraception.     Review of Systems   All other systems reviewed and are negative.       Objective:     Vitals:    06/02/23 1010   BP: (!) 110/58   Pulse: 73     Physical Exam  Vitals and nursing note reviewed.   Constitutional:       General: She is not in acute distress.     Appearance: Normal appearance. She is not ill-appearing, toxic-appearing or diaphoretic.   HENT:      Head: Normocephalic and atraumatic.   Eyes:      General: No scleral icterus.     Conjunctiva/sclera: Conjunctivae normal.   Cardiovascular:      Rate and Rhythm: Normal rate.      Heart sounds: Normal heart sounds.   Pulmonary:      Effort: Pulmonary effort is normal. No respiratory distress.      Breath sounds: Normal breath sounds.   Abdominal:      General: Bowel sounds are normal.   Genitourinary:         Comments: Area of concern examined. Appears consistent  with healed fissure without re approximation. Epithelialized appropriately and mucosal healed.  Skin:     Coloration: Skin is not pale.   Neurological:      Mental Status: She is alert. Mental status is at baseline.   Psychiatric:         Attention and Perception: Attention and perception normal.         Mood and Affect: Mood and affect normal.         Speech: Speech normal.         Behavior: Behavior normal.         Cognition and Memory: Cognition and memory normal.         Judgment: Judgment normal.     Assessment:       1. Encounter for general adult medical examination with abnormal findings    2. Need for hepatitis C screening test    3. Encounter for screening for HIV    4. Dry mouth    5. Vaginal dryness    6. Fatigue, unspecified type    7. Vitamin D deficiency    8. Multiple joint pain          Plan:   Recent relevant labs results reviewed with patient.     Labs ordered for [facility] lab then reordered for quest given insurance coverage.     Discussed that it may have been initial fissure from inappropriate relaxing and ongoing fear response with some confounding side effect or ineffective self or artifical lubrication. Not clear but anecdotally possible for vaginal dryness with CHC use, but overall benefit v risk for any contraceptive choice.   Labs as below.   May find pelvic floor PT informative and helpful to her process.   Per derm for biopsy.         1. Encounter for general adult medical examination with abnormal findings  -     Cancel: Urinalysis, Reflex to Urine Culture Urine, Clean Catch; Future; Expected date: 06/02/2023  -     Hepatic Function Panel; Future; Expected date: 06/02/2023  -     Renal Function Panel; Future; Expected date: 06/02/2023  -     Lipid Panel; Future; Expected date: 06/02/2023  -     TSH; Future; Expected date: 06/02/2023  -     Hemoglobin A1C; Future; Expected date: 06/02/2023  -     CBC Auto Differential; Future; Expected date: 06/02/2023  -     CBC Auto Differential;  Future; Expected date: 06/02/2023  -     Hemoglobin A1C; Future; Expected date: 06/02/2023  -     Hepatic Function Panel; Future; Expected date: 06/02/2023  -     Lipid Panel; Future; Expected date: 06/02/2023  -     Renal Function Panel; Future; Expected date: 06/02/2023  -     TSH; Future; Expected date: 06/02/2023  -     Urinalysis, Reflex to Urine Culture Urine, Clean Catch; Future; Expected date: 06/02/2023    2. Need for hepatitis C screening test  -     Hepatitis C Antibody; Future; Expected date: 06/02/2023  -     Hepatitis C Antibody; Future; Expected date: 06/02/2023    3. Encounter for screening for HIV  -     HIV 1/2 Ag/Ab (4th Gen); Future; Expected date: 06/02/2023  -     HIV 1/2 Ag/Ab (4th Gen); Future; Expected date: 06/02/2023    4. Dry mouth  -     TESS Screen w/Reflex; Future; Expected date: 06/02/2023  -     PTH, Intact; Future; Expected date: 06/02/2023  -     ANTI -SSA ANTIBODY; Future; Expected date: 06/02/2023  -     Cancel: Sedimentation rate; Future; Expected date: 06/02/2023  -     Cancel: C-Reactive Protein; Future; Expected date: 06/02/2023  -     TESS Screen w/Reflex; Future; Expected date: 06/02/2023  -     ANTI -SSA ANTIBODY; Future; Expected date: 06/02/2023  -     C-Reactive Protein; Future; Expected date: 06/02/2023  -     PTH, Intact; Future; Expected date: 06/02/2023  -     Sedimentation rate; Future; Expected date: 06/02/2023    5. Vaginal dryness  -     TESS Screen w/Reflex; Future; Expected date: 06/02/2023  -     FOLLICLE STIMULATING HORMONE; Future; Expected date: 06/02/2023  -     LUTEINIZING HORMONE; Future; Expected date: 06/02/2023  -     Estradiol; Future; Expected date: 06/02/2023  -     ANTI -SSA ANTIBODY; Future; Expected date: 06/02/2023  -     TESS Screen w/Reflex; Future; Expected date: 06/02/2023  -     ANTI -SSA ANTIBODY; Future; Expected date: 06/02/2023  -     Estradiol; Future; Expected date: 06/02/2023  -     FOLLICLE STIMULATING HORMONE; Future; Expected date:  06/02/2023  -     LUTEINIZING HORMONE; Future; Expected date: 06/02/2023    6. Fatigue, unspecified type  -     Hepatic Function Panel; Future; Expected date: 06/02/2023  -     Renal Function Panel; Future; Expected date: 06/02/2023  -     TSH; Future; Expected date: 06/02/2023  -     CBC Auto Differential; Future; Expected date: 06/02/2023  -     Vitamin B12; Future; Expected date: 06/02/2023  -     Methylmalonic Acid, Serum; Future; Expected date: 06/02/2023  -     Folate; Future; Expected date: 06/02/2023  -     Iron and TIBC; Future; Expected date: 06/02/2023  -     Ferritin; Future; Expected date: 06/02/2023  -     VITAMIN B1; Future; Expected date: 06/02/2023  -     T4, Free; Future; Expected date: 06/02/2023  -     CBC Auto Differential; Future; Expected date: 06/02/2023  -     Ferritin; Future; Expected date: 06/02/2023  -     Folate; Future; Expected date: 06/02/2023  -     Hepatic Function Panel; Future; Expected date: 06/02/2023  -     Iron and TIBC; Future; Expected date: 06/02/2023  -     Methylmalonic Acid, Serum; Future; Expected date: 06/02/2023  -     Renal Function Panel; Future; Expected date: 06/02/2023  -     T4, Free; Future; Expected date: 06/02/2023  -     TSH; Future; Expected date: 06/02/2023  -     VITAMIN B1; Future; Expected date: 06/02/2023  -     Vitamin B12; Future; Expected date: 06/02/2023    7. Vitamin D deficiency    8. Multiple joint pain  -     TESS Screen w/Reflex; Future; Expected date: 06/02/2023  -     Cancel: Sedimentation rate; Future; Expected date: 06/02/2023  -     Cancel: C-Reactive Protein; Future; Expected date: 06/02/2023  -     TESS Screen w/Reflex; Future; Expected date: 06/02/2023  -     C-Reactive Protein; Future; Expected date: 06/02/2023  -     Sedimentation rate; Future; Expected date: 06/02/2023  -     Sedimentation Rate      Patient's questions answered. Plan reviewed with patient at the end of visit. Relevant precautions to chief complaint and reasons to  seek further medical care or to contact the office sooner reviewed with patient.     Follow up if symptoms worsen or fail to improve.

## 2023-06-06 LAB — ENA SS-A AB SER IA-ACNC: NORMAL AI

## 2023-06-07 PROBLEM — K60.2 PERINEAL FISSURE: Chronic | Status: ACTIVE | Noted: 2023-06-07

## 2023-06-11 LAB
PTH-INTACT SERPL-MCNC: 52 PG/ML (ref 16–77)
VIT B1 BLD-SCNC: 131 NMOL/L (ref 78–185)

## 2023-06-13 LAB
ALBUMIN SERPL-MCNC: 4.5 G/DL (ref 3.6–5.1)
ALBUMIN SERPL-MCNC: 4.5 G/DL (ref 3.6–5.1)
ALBUMIN/GLOB SERPL: 1.9 (CALC) (ref 1–2.5)
ALP SERPL-CCNC: 62 U/L (ref 31–125)
ALT SERPL-CCNC: 26 U/L (ref 6–29)
ANA SER QL IF: NEGATIVE
AST SERPL-CCNC: 22 U/L (ref 10–30)
BASOPHILS # BLD AUTO: 31 CELLS/UL (ref 0–200)
BASOPHILS NFR BLD AUTO: 0.6 %
BILIRUB DIRECT SERPL-MCNC: 0.1 MG/DL
BILIRUB INDIRECT SERPL-MCNC: 0.5 MG/DL (CALC) (ref 0.2–1.2)
BILIRUB SERPL-MCNC: 0.6 MG/DL (ref 0.2–1.2)
BUN SERPL-MCNC: 11 MG/DL (ref 7–25)
BUN/CREAT SERPL: NORMAL (CALC) (ref 6–22)
CALCIUM SERPL-MCNC: 9.2 MG/DL (ref 8.6–10.2)
CHLORIDE SERPL-SCNC: 104 MMOL/L (ref 98–110)
CHOLEST SERPL-MCNC: 156 MG/DL
CHOLEST/HDLC SERPL: 2.8 (CALC)
CO2 SERPL-SCNC: 25 MMOL/L (ref 20–32)
CREAT SERPL-MCNC: 0.56 MG/DL (ref 0.5–0.96)
CRP SERPL-MCNC: 0.3 MG/L
EGFR: 130 ML/MIN/1.73M2
EOSINOPHIL # BLD AUTO: 41 CELLS/UL (ref 15–500)
EOSINOPHIL NFR BLD AUTO: 0.8 %
ERYTHROCYTE [DISTWIDTH] IN BLOOD BY AUTOMATED COUNT: 12.5 % (ref 11–15)
ERYTHROCYTE [SEDIMENTATION RATE] IN BLOOD BY WESTERGREN METHOD: 2 MM/H
FERRITIN SERPL-MCNC: 15 NG/ML (ref 16–154)
FOLATE SERPL-MCNC: 10.2 NG/ML
GLOBULIN SER CALC-MCNC: 2.4 G/DL (CALC) (ref 1.9–3.7)
GLUCOSE SERPL-MCNC: 94 MG/DL (ref 65–99)
HBA1C MFR BLD: 4.5 % OF TOTAL HGB
HCT VFR BLD AUTO: 41 % (ref 35–45)
HCV AB S/CO SERPL IA: 0.1
HCV AB SERPL QL IA: NORMAL
HDLC SERPL-MCNC: 55 MG/DL
HGB BLD-MCNC: 13.7 G/DL (ref 11.7–15.5)
HIV 1+2 AB+HIV1 P24 AG SERPL QL IA: NORMAL
IRON SATN MFR SERPL: 23 % (CALC) (ref 16–45)
IRON SERPL-MCNC: 88 MCG/DL (ref 40–190)
LDLC SERPL CALC-MCNC: 84 MG/DL (CALC)
LYMPHOCYTES # BLD AUTO: 1596 CELLS/UL (ref 850–3900)
LYMPHOCYTES NFR BLD AUTO: 31.3 %
MCH RBC QN AUTO: 30.9 PG (ref 27–33)
MCHC RBC AUTO-ENTMCNC: 33.4 G/DL (ref 32–36)
MCV RBC AUTO: 92.3 FL (ref 80–100)
METHYLMALONATE SERPL-SCNC: 157 NMOL/L (ref 87–318)
MONOCYTES # BLD AUTO: 326 CELLS/UL (ref 200–950)
MONOCYTES NFR BLD AUTO: 6.4 %
NEUTROPHILS # BLD AUTO: 3106 CELLS/UL (ref 1500–7800)
NEUTROPHILS NFR BLD AUTO: 60.9 %
NONHDLC SERPL-MCNC: 101 MG/DL (CALC)
PHOSPHATE SERPL-MCNC: 3.7 MG/DL (ref 2.5–4.5)
PLATELET # BLD AUTO: 267 THOUSAND/UL (ref 140–400)
PMV BLD REES-ECKER: 10.7 FL (ref 7.5–12.5)
POTASSIUM SERPL-SCNC: 4.3 MMOL/L (ref 3.5–5.3)
PROT SERPL-MCNC: 6.9 G/DL (ref 6.1–8.1)
RBC # BLD AUTO: 4.44 MILLION/UL (ref 3.8–5.1)
SODIUM SERPL-SCNC: 136 MMOL/L (ref 135–146)
T4 FREE SERPL-MCNC: 1.1 NG/DL (ref 0.8–1.8)
TIBC SERPL-MCNC: 379 MCG/DL (CALC) (ref 250–450)
TRIGL SERPL-MCNC: 78 MG/DL
TSH SERPL-ACNC: 2.96 MIU/L
VIT B12 SERPL-MCNC: 276 PG/ML (ref 200–1100)
WBC # BLD AUTO: 5.1 THOUSAND/UL (ref 3.8–10.8)

## 2024-03-20 ENCOUNTER — PATIENT MESSAGE (OUTPATIENT)
Dept: OBSTETRICS AND GYNECOLOGY | Facility: CLINIC | Age: 24
End: 2024-03-20
Payer: COMMERCIAL

## 2024-03-20 ENCOUNTER — TELEPHONE (OUTPATIENT)
Dept: OBSTETRICS AND GYNECOLOGY | Facility: CLINIC | Age: 24
End: 2024-03-20
Payer: COMMERCIAL

## 2024-03-20 NOTE — TELEPHONE ENCOUNTER
Added break the glass on patient chart per anders        ----- Message from Rebecca Wiley sent at 3/20/2024  7:47 AM CDT -----  Needs advice from nurse:      Who Called:pt  Regarding:pt has moved and her new doctor has Epic, needs you to mainor them as sensitive so new doctor cannot see them  Would the patient rather a call back or VIA [facility]?  Best Call Back number:[phone]  Additional Info:

## 2025-03-10 ENCOUNTER — PATIENT MESSAGE (OUTPATIENT)
Dept: FAMILY MEDICINE | Facility: CLINIC | Age: 25
End: 2025-03-10
Payer: COMMERCIAL